# Patient Record
Sex: FEMALE | Race: WHITE | Employment: FULL TIME | URBAN - METROPOLITAN AREA
[De-identification: names, ages, dates, MRNs, and addresses within clinical notes are randomized per-mention and may not be internally consistent; named-entity substitution may affect disease eponyms.]

---

## 2017-06-22 ENCOUNTER — HOSPITAL ENCOUNTER (OUTPATIENT)
Age: 57
Discharge: HOME | End: 2017-06-22
Attending: EMERGENCY MEDICINE
Payer: COMMERCIAL

## 2017-06-22 VITALS
HEIGHT: 62 IN | BODY MASS INDEX: 32.39 KG/M2 | OXYGEN SATURATION: 97 % | RESPIRATION RATE: 16 BRPM | HEART RATE: 80 BPM | WEIGHT: 176 LBS | SYSTOLIC BLOOD PRESSURE: 132 MMHG | TEMPERATURE: 99 F | DIASTOLIC BLOOD PRESSURE: 79 MMHG

## 2017-06-22 DIAGNOSIS — T78.40XA RASH DUE TO ALLERGY: Primary | ICD-10-CM

## 2017-06-22 DIAGNOSIS — R21 RASH DUE TO ALLERGY: Primary | ICD-10-CM

## 2017-06-22 PROCEDURE — 99213 OFFICE O/P EST LOW 20 MIN: CPT

## 2017-06-22 PROCEDURE — 99214 OFFICE O/P EST MOD 30 MIN: CPT

## 2017-06-22 RX ORDER — METHYLPREDNISOLONE 4 MG/1
TABLET ORAL
Qty: 1 PACKAGE | Refills: 0 | Status: SHIPPED | OUTPATIENT
Start: 2017-06-22 | End: 2017-06-30 | Stop reason: ALTCHOICE

## 2017-06-22 RX ORDER — EPINEPHRINE 0.3 MG/.3ML
0.3 INJECTION SUBCUTANEOUS
Qty: 1 EACH | Refills: 0 | Status: SHIPPED | OUTPATIENT
Start: 2017-06-22 | End: 2017-07-22

## 2017-06-22 NOTE — ED PROVIDER NOTES
Patient Seen in: THE MEDICAL [facility] In [facility]    History   Patient presents with:  Rash    Stated Complaint: RASH    HPI    Patient complains a rash. Rash initially started on her neck on Saturday.   It seemed to get a little bit better on Tuesda History   Problem Relation Age of Onset   • Diabetes Father    • Heart Disease Father    • Hypertension Paternal Grandmother    • Hypertension Sister    • Obesity Sister    • Cancer Sister 52     breast  brca 1 negative   • Breast Cancer Sister 50     TRIP This rash is pruritic. There are no petechial lesions. No target lesions. No obvious bites. Neurologic:  Mental status as above.   Patient moves all extremities with good strength      ED Course   Labs Reviewed - No data to display    MDM   Patient has

## 2017-06-22 NOTE — ED INITIAL ASSESSMENT (HPI)
Rash- started on the neck Saturday, subsiding Tuesday, again flared again Wednesday. Denies fever, or used any new products. Pt has been taking bneadryl at night and using caladryl lotion but no relief. Pt states spreading to arms, abdomen.  C/o itching an

## 2017-06-30 ENCOUNTER — OFFICE VISIT (OUTPATIENT)
Dept: INTERNAL MEDICINE CLINIC | Facility: CLINIC | Age: 57
End: 2017-06-30

## 2017-06-30 VITALS
WEIGHT: 177.5 LBS | HEART RATE: 91 BPM | BODY MASS INDEX: 32.66 KG/M2 | RESPIRATION RATE: 18 BRPM | HEIGHT: 62 IN | DIASTOLIC BLOOD PRESSURE: 60 MMHG | SYSTOLIC BLOOD PRESSURE: 124 MMHG | TEMPERATURE: 98 F | OXYGEN SATURATION: 97 %

## 2017-06-30 DIAGNOSIS — L50.9 URTICARIA: ICD-10-CM

## 2017-06-30 DIAGNOSIS — Z12.39 SCREENING FOR BREAST CANCER: ICD-10-CM

## 2017-06-30 DIAGNOSIS — L91.8 SKIN TAG: ICD-10-CM

## 2017-06-30 DIAGNOSIS — D22.9 ATYPICAL MOLE: ICD-10-CM

## 2017-06-30 DIAGNOSIS — Z00.00 ROUTINE GENERAL MEDICAL EXAMINATION AT A HEALTH CARE FACILITY: Primary | ICD-10-CM

## 2017-06-30 PROCEDURE — 99386 PREV VISIT NEW AGE 40-64: CPT | Performed by: INTERNAL MEDICINE

## 2017-06-30 PROCEDURE — 99203 OFFICE O/P NEW LOW 30 MIN: CPT | Performed by: INTERNAL MEDICINE

## 2017-06-30 RX ORDER — PREDNISONE 20 MG/1
20 TABLET ORAL DAILY
Qty: 7 TABLET | Refills: 0 | Status: SHIPPED | OUTPATIENT
Start: 2017-06-30 | End: 2017-07-07

## 2017-06-30 NOTE — PROGRESS NOTES
Mario Akhtar is a 61year old female. HPI:   Patient presents for Rash and physical exam. Knows she will be billed for TWO visits. 1. Rash: developed 2 weeks ago. Seen in urgent care and given bendaryl, allegra, and prednisone course x 6 days.  Sh Obesity Sister    • Cancer Sister 52     breast  brca 1 negative   • Breast Cancer Sister 50     TRIPLE NEGATIVE   • [other] Noah May Brother      2 brothers mentally challenged   • Hypertension Brother    • Hypertension Paternal Grandmother    • Stroke Neg allergy/immunology. # Left Hip Discomfort: going on for years. Has been evaluated by ortho and it was thought to be 2/2 neuropathy, she then saw chiropracter for 2.5 years with minimal improvement, turmeric was the most helpful intervention.    # Health M

## 2017-07-20 ENCOUNTER — HOSPITAL ENCOUNTER (OUTPATIENT)
Dept: MAMMOGRAPHY | Facility: HOSPITAL | Age: 57
Discharge: HOME | End: 2017-07-20
Attending: INTERNAL MEDICINE
Payer: COMMERCIAL

## 2017-07-20 ENCOUNTER — APPOINTMENT (OUTPATIENT)
Dept: LAB | Facility: HOSPITAL | Age: 57
End: 2017-07-20
Attending: INTERNAL MEDICINE
Payer: COMMERCIAL

## 2017-07-20 DIAGNOSIS — Z00.00 ROUTINE GENERAL MEDICAL EXAMINATION AT A HEALTH CARE FACILITY: ICD-10-CM

## 2017-07-20 DIAGNOSIS — L50.9 URTICARIA: ICD-10-CM

## 2017-07-20 DIAGNOSIS — Z12.39 SCREENING FOR BREAST CANCER: ICD-10-CM

## 2017-07-20 PROBLEM — E55.9 VITAMIN D DEFICIENCY: Status: ACTIVE | Noted: 2017-07-20

## 2017-07-20 LAB
25-HYDROXYVITAMIN D (TOTAL): 18.1 NG/ML (ref 30–100)
ALT SERPL-CCNC: 23 U/L (ref 14–54)
AST SERPL-CCNC: 14 U/L (ref 15–41)
BUN BLD-MCNC: 18 MG/DL (ref 8–20)
CALCIUM BLD-MCNC: 9.3 MG/DL (ref 8.3–10.3)
CHLORIDE: 106 MMOL/L (ref 101–111)
CHOLEST SMN-MCNC: 189 MG/DL (ref ?–200)
CO2: 29 MMOL/L (ref 22–32)
CREAT BLD-MCNC: 0.83 MG/DL (ref 0.55–1.02)
GLUCOSE BLD-MCNC: 95 MG/DL (ref 70–99)
HDLC SERPL-MCNC: 63 MG/DL (ref 45–?)
HDLC SERPL: 3 {RATIO} (ref ?–4.44)
HEPATITIS C VIRUS AB INTERPRETATION: NONREACTIVE
LDLC SERPL CALC-MCNC: 100 MG/DL (ref ?–130)
LDLC SERPL-MCNC: 26 MG/DL (ref 5–40)
NONHDLC SERPL-MCNC: 126 MG/DL (ref ?–130)
POTASSIUM SERPL-SCNC: 4 MMOL/L (ref 3.6–5.1)
SODIUM SERPL-SCNC: 142 MMOL/L (ref 136–144)
TRIGLYCERIDES: 131 MG/DL (ref ?–150)

## 2017-07-20 PROCEDURE — 80048 BASIC METABOLIC PNL TOTAL CA: CPT

## 2017-07-20 PROCEDURE — 84450 TRANSFERASE (AST) (SGOT): CPT

## 2017-07-20 PROCEDURE — 77066 DX MAMMO INCL CAD BI: CPT | Performed by: INTERNAL MEDICINE

## 2017-07-20 PROCEDURE — 77062 BREAST TOMOSYNTHESIS BI: CPT | Performed by: INTERNAL MEDICINE

## 2017-07-20 PROCEDURE — 86803 HEPATITIS C AB TEST: CPT

## 2017-07-20 PROCEDURE — 36415 COLL VENOUS BLD VENIPUNCTURE: CPT

## 2017-07-20 PROCEDURE — 84460 ALANINE AMINO (ALT) (SGPT): CPT

## 2017-07-20 PROCEDURE — 80061 LIPID PANEL: CPT

## 2017-07-20 PROCEDURE — 76641 ULTRASOUND BREAST COMPLETE: CPT | Performed by: INTERNAL MEDICINE

## 2017-07-20 PROCEDURE — 82306 VITAMIN D 25 HYDROXY: CPT

## 2017-07-26 ENCOUNTER — TELEPHONE (OUTPATIENT)
Dept: INTERNAL MEDICINE CLINIC | Facility: CLINIC | Age: 57
End: 2017-07-26

## 2017-07-26 NOTE — TELEPHONE ENCOUNTER
Advised patient to schedule appointment but patient was hoping to discuss issue by phone, please advise

## 2017-07-27 NOTE — TELEPHONE ENCOUNTER
Patient informed of order, patient verbalized understanding.  Patient scheduled appointment for 7/31/17 @ 4:30 pm

## 2017-07-31 ENCOUNTER — OFFICE VISIT (OUTPATIENT)
Dept: INTERNAL MEDICINE CLINIC | Facility: CLINIC | Age: 57
End: 2017-07-31

## 2017-07-31 VITALS
DIASTOLIC BLOOD PRESSURE: 60 MMHG | TEMPERATURE: 98 F | WEIGHT: 181.75 LBS | SYSTOLIC BLOOD PRESSURE: 120 MMHG | RESPIRATION RATE: 12 BRPM | HEIGHT: 62 IN | OXYGEN SATURATION: 95 % | BODY MASS INDEX: 33.45 KG/M2 | HEART RATE: 65 BPM

## 2017-07-31 DIAGNOSIS — E66.9 OBESITY (BMI 30.0-34.9): ICD-10-CM

## 2017-07-31 DIAGNOSIS — R14.0 ABDOMINAL BLOATING: Primary | ICD-10-CM

## 2017-07-31 DIAGNOSIS — E55.9 VITAMIN D DEFICIENCY: ICD-10-CM

## 2017-07-31 DIAGNOSIS — N95.1 MENOPAUSAL SYMPTOM: ICD-10-CM

## 2017-07-31 PROCEDURE — 99214 OFFICE O/P EST MOD 30 MIN: CPT | Performed by: INTERNAL MEDICINE

## 2017-07-31 RX ORDER — ERGOCALCIFEROL 1.25 MG/1
50000 CAPSULE ORAL WEEKLY
Qty: 4 CAPSULE | Refills: 1 | Status: SHIPPED | OUTPATIENT
Start: 2017-07-31 | End: 2017-08-30

## 2017-07-31 NOTE — PROGRESS NOTES
Gretta Calderon is a 61year old female. HPI:   Patient presents for the following. 1. Vitamin D deficiency: has not started repletion yet. 2. Abdominal bloating: feels she has gas all the time. Feels her abdomen is constantly distended.  Always fee Medical History:   Diagnosis Date   • Allergic rhinitis    • Back pain    • Migraine    • Obesity, unspecified       Past Surgical History:  2013: BACK SURGERY      Comment: L5-S1 disc herniation repair  No date: CHOLECYSTECTOMY  12/05/11: COLONOSCOPY  199 evaluated by ortho and it was thought to be 2/2 neuropathy, she then saw chiropracter for 2.5 years with minimal improvement, turmeric was the most helpful intervention. # Vitamin D Deficiency: discussed repletion.    # Health Maintenance: CPX on 6/30/201

## 2017-07-31 NOTE — PATIENT INSTRUCTIONS
Vitamin D level is low. I have ordered 50,000 units of vitamin D3 to be taken once weekly for 8 weeks. After this is completed, should start over the counter vitamin D3 2000 units once daily. Repeat vitamin D level in 9 weeks. Order is in system.     You ca [facility], [address]. All rights reserved. This information is not intended as a substitute for professional medical care. Always follow your healthcare professional's instructions. What is Irritable Bowel Syndrome (IBS)?      Muscle the nerves or muscles in your digestive tract. · There is also some evidence that certain bacteria found after a severe gastroinstestinal infection in the small intestine and colon may cause IBS.   · While stress and anxiety worsen the symptoms of IBS, it relieve your symptoms, so you can lead a full and active life. There is no cure for IBS.  But it can be managed. Diet  Your diet did not cause your IBS, but it can affect it.  No one diet works for everyone. Finding the best foods for you may take trial and medicines for diarrhea. · Avoid anti-inflammatory medicines like ibuprofen or naproxen. · Consider nutritional supplements.  This is especially true if your diarrhea is prolonged, or you aren't eating or are losing weight  Follow-up care  Follow up with tabitha

## 2018-08-01 ENCOUNTER — OFFICE VISIT (OUTPATIENT)
Dept: INTERNAL MEDICINE CLINIC | Facility: CLINIC | Age: 58
End: 2018-08-01
Payer: COMMERCIAL

## 2018-08-01 VITALS
DIASTOLIC BLOOD PRESSURE: 70 MMHG | SYSTOLIC BLOOD PRESSURE: 122 MMHG | TEMPERATURE: 99 F | RESPIRATION RATE: 16 BRPM | WEIGHT: 176 LBS | HEIGHT: 62 IN | HEART RATE: 92 BPM | BODY MASS INDEX: 32.39 KG/M2

## 2018-08-01 DIAGNOSIS — Z12.39 SCREENING FOR BREAST CANCER: ICD-10-CM

## 2018-08-01 DIAGNOSIS — IMO0002 CHRONIC MIGRAINE: ICD-10-CM

## 2018-08-01 DIAGNOSIS — Z80.3 FAMILY HISTORY OF BREAST CANCER IN SISTER: ICD-10-CM

## 2018-08-01 DIAGNOSIS — E66.9 OBESITY (BMI 30.0-34.9): ICD-10-CM

## 2018-08-01 DIAGNOSIS — E55.9 VITAMIN D DEFICIENCY: ICD-10-CM

## 2018-08-01 DIAGNOSIS — Z00.00 ROUTINE GENERAL MEDICAL EXAMINATION AT A HEALTH CARE FACILITY: Primary | ICD-10-CM

## 2018-08-01 PROCEDURE — 99396 PREV VISIT EST AGE 40-64: CPT | Performed by: INTERNAL MEDICINE

## 2018-08-01 RX ORDER — ACETAMINOPHEN 160 MG
2000 TABLET,DISINTEGRATING ORAL DAILY
COMMUNITY
End: 2020-02-22

## 2018-08-01 NOTE — PATIENT INSTRUCTIONS
You need 3 eight ounce servings of calcium/vitamin D daily. This includes spinach, kale, broccoli, almonds, milk, yogurt, or cottage cheese. I also advise you get the shingrix vaccine once in a lifetime.  This is NEW and considered better than the previ

## 2018-08-01 NOTE — PROGRESS NOTES
Michaela Paris is a 61year old female. HPI:   Patient presents for the physical exam. She was 13 minutes late to appt. 1. Obesity: she is working with a  and exercising 3-4 times per week.   24 hour dietary recall:  Lunch yesterday rhinitis    • Back pain    • Migraine    • Obesity, unspecified       Past Surgical History:  2013: BACK SURGERY      Comment: L5-S1 disc herniation repair  No date: CHOLECYSTECTOMY  12/05/11: COLONOSCOPY  1995: HYSTERECTOMY      Comment: without bso, fibr she then saw chiropracter for 2.5 years with minimal improvement. # Vitamin D Deficiency: discussed repletion. # Health Maintenance: CPX on 8/1/2018  Stress Management: feels she sb with stress well.    Indication for ASA (50-55 yo): 10 year risk is

## 2018-08-30 ENCOUNTER — TELEPHONE (OUTPATIENT)
Dept: INTERNAL MEDICINE CLINIC | Facility: CLINIC | Age: 58
End: 2018-08-30

## 2018-08-30 NOTE — TELEPHONE ENCOUNTER
Spoke to patient, dizziness has occurred last week and this week (sx off/on) notes some incidents after exercise but R ear last few days uncomfortable with dizziness.  Patient informed no appointments available but encouraged to IC for evaluation and f/u wi

## 2018-09-07 ENCOUNTER — OFFICE VISIT (OUTPATIENT)
Dept: INTERNAL MEDICINE CLINIC | Facility: CLINIC | Age: 58
End: 2018-09-07
Payer: COMMERCIAL

## 2018-09-07 VITALS
HEART RATE: 63 BPM | OXYGEN SATURATION: 98 % | RESPIRATION RATE: 16 BRPM | WEIGHT: 181.25 LBS | TEMPERATURE: 98 F | SYSTOLIC BLOOD PRESSURE: 116 MMHG | DIASTOLIC BLOOD PRESSURE: 68 MMHG | HEIGHT: 62 IN | BODY MASS INDEX: 33.35 KG/M2

## 2018-09-07 DIAGNOSIS — E55.9 VITAMIN D DEFICIENCY: ICD-10-CM

## 2018-09-07 DIAGNOSIS — K64.4 HEMORRHOIDAL SKIN TAG: ICD-10-CM

## 2018-09-07 DIAGNOSIS — E66.9 OBESITY (BMI 30.0-34.9): ICD-10-CM

## 2018-09-07 DIAGNOSIS — H81.11 BPPV (BENIGN PAROXYSMAL POSITIONAL VERTIGO), RIGHT: Primary | ICD-10-CM

## 2018-09-07 PROCEDURE — 99214 OFFICE O/P EST MOD 30 MIN: CPT | Performed by: INTERNAL MEDICINE

## 2018-09-07 RX ORDER — MECLIZINE HYDROCHLORIDE 25 MG/1
TABLET ORAL
Refills: 0 | COMMUNITY
Start: 2018-08-31 | End: 2020-02-22

## 2018-09-07 NOTE — PROGRESS NOTES
Porsha Paredes is a 61year old female. HPI:   Patient presents for dizzyness. This started 2-3 weeks ago. First episode develoepd while exercising with her  and with position changes. Felt the whole room spinning out of control.  Felt better a nose    Family History   Problem Relation Age of Onset   • Diabetes Father    • Heart Disease Father    • Heart Disorder Father      CHF   • Hypertension Sister    • Obesity Sister    • Cancer Sister 52     breast  brca 1 negative   • Breast Cancer Sister PLAN:   # Right BPPV: educated and demonstrated epley maneuvers and explained pathophysiology. # Hemorrhoidal Skin Tag 2/2 Constipation: discussed ways to prevent constipation which leads to hemorrhoids with high fiber diet, hydration, and exercise.  Supp BPPV.     The patient indicates understanding of these issues and agrees to the plan. The patient is asked to return in 1 year for CPX.    Benedetta Litten, MD

## 2018-09-07 NOTE — PATIENT INSTRUCTIONS
Benign Paroxysmal Positional Vertigo     Your health care provider may move your head in certain ways to treat your BPPV. Benign paroxysmal positional vertigo (BPPV) is a problem with the inner ear. The inner ear contains the vestibular system.  This Treatment for BPPV  Your healthcare provider may try to move the calcium crystals. This is done by having you move your head and neck in certain ways. This treatment is safe and often works well. You may also be told to do these movements at home.  You may ? Medicines. Your healthcare provider may recommend stool softeners, suppositories, or laxatives to help manage constipation. Use these exactly as directed. ? Sitz baths. A sitz bath involves sitting in a few inches of warm bath water.  Be careful not to m · Increased pain around the rectum or anus  · Weakness or dizziness  Call 911  Call 911 if any of these occur:  · Trouble breathing or swallowing  · Fainting or loss of consciousness  · Unusually fast heart rate  · Vomiting blood  · Large amounts of blood · Use other measures. Laxatives and enemas can help ease constipation. But use them only on your healthcare provider’s advice. For symptom relief, try using cotton pads soaked in witch hazel. These are available at most drugstores.  Over-the-counter hemorrh High-fiber foods offer many benefits. By making your stools softer, they help heal and prevent swollen hemorrhoids. They may also help reduce the risk of colon and rectal cancer. Best of all, they’re usually low in calories and taste great.  Here are some e · External hemorrhoid tissue lies under the anal skin. · The anus is the passage between the rectum and the outside of the body. Normal hemorrhoid tissue  Hemorrhoid tissues play an important role in helping your body eliminate waste.  Food passes from th There’s no single cause of hemorrhoids.  Most often, though, they are caused by too much pressure on the anal canal. This can be due to:  · Chronic (ongoing) constipation  · Straining during bowel movements  · Sitting too long on the toilet  · Strenuous exe Potential complications of constipation can include:  · Hemorrhoids  · Rectal bleeding from hemorrhoids or anal fissures (skin tears)  · Hernias  · Dependency on laxatives  · Chronic constipation  · Fecal impaction  · Bowel obstruction or perforation  Home · Fainting or loss of consciousness  · Rapid heart rate  · Chest pain  When to seek medical advice  Call your healthcare provider right away if any of these occur:  · Fever of 100.4°F (38°C) or higher, or as directed by your healthcare provider  · Failure Exercise helps improve the working of your colon which helps ease constipation. Try to get some physical activity every day. If you haven’t been active for a while, talk to your healthcare provider before starting again.   Laxatives  Your healthcare provide · Soluble fiber. This type of fiber is in oats, beans, and certain fruits and vegetables such as strawberries and peas. Soluble fiber can reduce cholesterol, which may help lower the risk for heart disease. It also helps control blood sugar levels.   Look f Date Last Reviewed: 6/1/2017  © 5655-2052 [employer] [address]. [address] [facility], [address]. All rights reserved. This information is not intended as a substitute for professional medical care.  Always follow your healthcare professional'

## 2018-09-17 ENCOUNTER — HOSPITAL ENCOUNTER (OUTPATIENT)
Dept: MAMMOGRAPHY | Facility: HOSPITAL | Age: 58
Discharge: HOME | End: 2018-09-17
Attending: INTERNAL MEDICINE
Payer: COMMERCIAL

## 2018-09-17 ENCOUNTER — APPOINTMENT (OUTPATIENT)
Dept: LAB | Facility: HOSPITAL | Age: 58
End: 2018-09-17
Attending: INTERNAL MEDICINE
Payer: COMMERCIAL

## 2018-09-17 ENCOUNTER — TELEPHONE (OUTPATIENT)
Dept: INTERNAL MEDICINE CLINIC | Facility: CLINIC | Age: 58
End: 2018-09-17

## 2018-09-17 DIAGNOSIS — Z12.39 SCREENING FOR BREAST CANCER: ICD-10-CM

## 2018-09-17 DIAGNOSIS — Z80.3 FAMILY HISTORY OF BREAST CANCER IN SISTER: ICD-10-CM

## 2018-09-17 DIAGNOSIS — Z00.00 ROUTINE GENERAL MEDICAL EXAMINATION AT A HEALTH CARE FACILITY: ICD-10-CM

## 2018-09-17 DIAGNOSIS — IMO0002 CHRONIC MIGRAINE: ICD-10-CM

## 2018-09-17 DIAGNOSIS — E66.9 OBESITY (BMI 30.0-34.9): ICD-10-CM

## 2018-09-17 DIAGNOSIS — H81.11 BPPV (BENIGN PAROXYSMAL POSITIONAL VERTIGO), RIGHT: Primary | ICD-10-CM

## 2018-09-17 DIAGNOSIS — E55.9 VITAMIN D DEFICIENCY: ICD-10-CM

## 2018-09-17 LAB
ALT SERPL-CCNC: 21 U/L (ref 14–54)
ANION GAP SERPL CALC-SCNC: 5 MMOL/L (ref 0–18)
AST SERPL-CCNC: 15 U/L (ref 15–41)
BUN BLD-MCNC: 16 MG/DL (ref 8–20)
BUN/CREAT SERPL: 19.3 (ref 10–20)
CALCIUM BLD-MCNC: 8.9 MG/DL (ref 8.3–10.3)
CHLORIDE SERPL-SCNC: 107 MMOL/L (ref 101–111)
CHOLEST SMN-MCNC: 181 MG/DL (ref ?–200)
CO2 SERPL-SCNC: 30 MMOL/L (ref 22–32)
CREAT BLD-MCNC: 0.83 MG/DL (ref 0.55–1.02)
EST. AVERAGE GLUCOSE BLD GHB EST-MCNC: 120 MG/DL (ref 68–126)
GLUCOSE BLD-MCNC: 101 MG/DL (ref 70–99)
HBA1C MFR BLD HPLC: 5.8 % (ref ?–5.7)
HDLC SERPL-MCNC: 75 MG/DL (ref 40–59)
LDLC SERPL CALC-MCNC: 84 MG/DL (ref ?–100)
NONHDLC SERPL-MCNC: 106 MG/DL (ref ?–130)
OSMOLALITY SERPL CALC.SUM OF ELEC: 295 MOSM/KG (ref 275–295)
POTASSIUM SERPL-SCNC: 3.8 MMOL/L (ref 3.6–5.1)
SODIUM SERPL-SCNC: 142 MMOL/L (ref 136–144)
TRIGL SERPL-MCNC: 111 MG/DL (ref 30–149)
VLDLC SERPL CALC-MCNC: 22 MG/DL (ref 0–30)

## 2018-09-17 PROCEDURE — 36415 COLL VENOUS BLD VENIPUNCTURE: CPT

## 2018-09-17 PROCEDURE — 84450 TRANSFERASE (AST) (SGOT): CPT

## 2018-09-17 PROCEDURE — 77066 DX MAMMO INCL CAD BI: CPT | Performed by: INTERNAL MEDICINE

## 2018-09-17 PROCEDURE — 83036 HEMOGLOBIN GLYCOSYLATED A1C: CPT

## 2018-09-17 PROCEDURE — 80061 LIPID PANEL: CPT

## 2018-09-17 PROCEDURE — 76641 ULTRASOUND BREAST COMPLETE: CPT | Performed by: INTERNAL MEDICINE

## 2018-09-17 PROCEDURE — 77062 BREAST TOMOSYNTHESIS BI: CPT | Performed by: INTERNAL MEDICINE

## 2018-09-17 PROCEDURE — 84460 ALANINE AMINO (ALT) (SGPT): CPT

## 2018-09-17 PROCEDURE — 80048 BASIC METABOLIC PNL TOTAL CA: CPT

## 2018-09-17 NOTE — TELEPHONE ENCOUNTER
Patient would like Dr. Ramila Saez to recommend a physical therapist to treat her vertigo. Please advise.

## 2020-02-21 PROBLEM — R73.03 PRE-DIABETES: Status: ACTIVE | Noted: 2020-02-21

## 2020-03-04 PROBLEM — H81.11 BENIGN PAROXYSMAL VERTIGO OF RIGHT EAR: Status: ACTIVE | Noted: 2020-03-04

## 2021-05-13 ENCOUNTER — APPOINTMENT (OUTPATIENT)
Dept: GENETICS | Facility: HOSPITAL | Age: 61
End: 2021-05-13
Attending: GENETIC COUNSELOR, MS
Payer: COMMERCIAL

## 2021-05-13 ENCOUNTER — APPOINTMENT (OUTPATIENT)
Dept: HEMATOLOGY/ONCOLOGY | Facility: HOSPITAL | Age: 61
End: 2021-05-13
Attending: GENETIC COUNSELOR, MS
Payer: COMMERCIAL

## 2021-06-18 ENCOUNTER — GENETICS ENCOUNTER (OUTPATIENT)
Dept: GENETICS | Facility: HOSPITAL | Age: 61
End: 2021-06-18
Attending: GENETIC COUNSELOR, MS
Payer: COMMERCIAL

## 2021-06-18 NOTE — PROGRESS NOTES
Referring Provider:  Debra Taylor NP    Additional Provider(s): Namoi Raman MD    Reason for Referral:  [place] was referred for genetic counseling because of a family history of breast cancer.   Ms. Denver Dao is a nulliparous 63year-old woman of cancer have a hereditary cancer syndrome.   Signs of a hereditary cancer syndrome include some rare cancers, common cancers occurring at unusually young ages, multiple primary cancers in the same individual, or the same type of cancer or related cancers (e. histories and should be discussed with a physician.       A variant of uncertain significance is a DNA change that may or may not alter the function of the gene; therefore, it is usually not possible to determine if the gene variant is responsible for an in management options for BRCA1/2 mutation carriers include increased breast cancer screening using mammograms and MRI, ovarian cancer screening, chemoprevention, and prophylactic surgery.   Men with a BRCA1/2 mutation should discuss clinical breast exams, dig

## 2021-07-09 ENCOUNTER — GENETICS ENCOUNTER (OUTPATIENT)
Dept: HEMATOLOGY/ONCOLOGY | Facility: HOSPITAL | Age: 61
End: 2021-07-09

## 2021-07-09 NOTE — PROGRESS NOTES
Referring Provider:                    Emmanuel Phillips NP     Additional Provider(s): Dominic Kamara MD     Reason for Referral:  Diann Oshea had genetic testing performed on 6/18/21 because of a family history of breast cancer.      Genetic Te future. Ms. Denver Dao should contact me on an annual basis to see if the VUS has been reclassified and to learn if there have been any updates in genetic testing that would apply to her. In the meantime, Ms. Denver Dao and her relatives should speak with the

## 2024-04-07 NOTE — PATIENT INSTRUCTIONS
For your hives:  1. Please use allegra 180 mg 2XD  2. Ranitidine/pepcid 1 tablet twice daily  3. Benadryl 25-50 mg every 6 hours as needed  4. Please complete a 7 day course of prednisone again.      You need 3 eight ounce servings of calcium/vitamin D jose None

## (undated) NOTE — LETTER
08/31/18        Gretta Calderon  [address]      Dear Liana Cox records indicate that you have outstanding lab work and or testing that was ordered for you and has not yet been completed:          Lipid Panel      Basic Met

## (undated) NOTE — ED AVS SNAPSHOT
[facility] in [address] [place] [address] Drive,[address]    [address] [place] [address]    Phone:  [phone]    Fax:  [phone]           Asad Hidalgo   MRN: [id number]    Department:  THE [facility] OF [facility] in [facility] END   Date of Visit:  6/22/2017 Medrol Dosepak starts today  Topical hydrocortisone on particularly itchy areas 2 or 3 times a day    Follow-up with your primary care doctor next week    EpiPen for emergency only    Discharge References/Attachments     SKIN RASHES, SELF-CARE FOR (ENGLISH care or specialist physician will see patients referred from the [facility]. Follow-up care is at the discretion of that Physician.     IF THERE IS ANY CHANGE OR WORSENING OF YOUR CONDITION, CALL YOUR PRIMARY CARE PHYSICIAN AT ONCE OR GO TO THE harming yourself, contact [facility] at [phone]. - If you don’t have insurance, Marcia Peace has partnered with Patient [address] to help you get signed up for insurance coverage.   [facility] [place]